# Patient Record
Sex: MALE | NOT HISPANIC OR LATINO | URBAN - METROPOLITAN AREA
[De-identification: names, ages, dates, MRNs, and addresses within clinical notes are randomized per-mention and may not be internally consistent; named-entity substitution may affect disease eponyms.]

---

## 2018-08-28 ENCOUNTER — APPOINTMENT (RX ONLY)
Dept: URBAN - METROPOLITAN AREA CLINIC 37 | Facility: CLINIC | Age: 33
Setting detail: DERMATOLOGY
End: 2018-08-28

## 2018-08-28 ENCOUNTER — APPOINTMENT (RX ONLY)
Dept: URBAN - METROPOLITAN AREA CLINIC 38 | Facility: CLINIC | Age: 33
Setting detail: DERMATOLOGY
End: 2018-08-28

## 2018-08-28 DIAGNOSIS — L20.89 OTHER ATOPIC DERMATITIS: ICD-10-CM

## 2018-08-28 PROBLEM — J45.909 UNSPECIFIED ASTHMA, UNCOMPLICATED: Status: ACTIVE | Noted: 2018-08-28

## 2018-08-28 PROBLEM — D23.5 OTHER BENIGN NEOPLASM OF SKIN OF TRUNK: Status: ACTIVE | Noted: 2018-08-28

## 2018-08-28 PROBLEM — L29.8 OTHER PRURITUS: Status: ACTIVE | Noted: 2018-08-28

## 2018-08-28 PROBLEM — L20.84 INTRINSIC (ALLERGIC) ECZEMA: Status: ACTIVE | Noted: 2018-08-28

## 2018-08-28 PROBLEM — L85.3 XEROSIS CUTIS: Status: ACTIVE | Noted: 2018-08-28

## 2018-08-28 PROCEDURE — ? PRESCRIPTION

## 2018-08-28 PROCEDURE — 99202 OFFICE O/P NEW SF 15 MIN: CPT | Mod: 25

## 2018-08-28 PROCEDURE — ? ADDITIONAL NOTES

## 2018-08-28 PROCEDURE — ? BIOPSY BY PUNCH METHOD

## 2018-08-28 PROCEDURE — ? COUNSELING

## 2018-08-28 PROCEDURE — 11100: CPT

## 2018-08-28 RX ORDER — TRIAMCINOLONE ACETONIDE 1 MG/G
1 CREAM TOPICAL BID
Qty: 1 | Refills: 1 | Status: ERX | COMMUNITY
Start: 2018-08-28

## 2018-08-28 RX ORDER — HYDROCORTISONE 25 MG/G
1 CREAM TOPICAL
Qty: 1 | Refills: 0 | Status: ERX | COMMUNITY
Start: 2018-08-28

## 2018-08-28 RX ADMIN — TRIAMCINOLONE ACETONIDE 1: 1 CREAM TOPICAL at 18:25

## 2018-08-28 RX ADMIN — HYDROCORTISONE 1: 25 CREAM TOPICAL at 18:28

## 2018-08-28 ASSESSMENT — SEVERITY ASSESSMENT
SEVERITY: SEVERE
SEVERITY: SEVERE

## 2018-08-28 ASSESSMENT — LOCATION SIMPLE DESCRIPTION DERM
LOCATION SIMPLE: LEFT UPPER ARM
LOCATION SIMPLE: LEFT CHEEK
LOCATION SIMPLE: RIGHT UPPER ARM
LOCATION SIMPLE: GROIN
LOCATION SIMPLE: RIGHT UPPER ARM
LOCATION SIMPLE: RIGHT THIGH
LOCATION SIMPLE: LEFT CHEEK
LOCATION SIMPLE: RIGHT THIGH
LOCATION SIMPLE: GROIN
LOCATION SIMPLE: LEFT UPPER ARM

## 2018-08-28 ASSESSMENT — LOCATION ZONE DERM
LOCATION ZONE: LEG
LOCATION ZONE: ARM
LOCATION ZONE: TRUNK
LOCATION ZONE: TRUNK
LOCATION ZONE: FACE
LOCATION ZONE: ARM
LOCATION ZONE: FACE
LOCATION ZONE: LEG

## 2018-08-28 ASSESSMENT — BSA ECZEMA
% BODY COVERED IN ECZEMA: 75
% BODY COVERED IN ECZEMA: 75

## 2018-08-28 ASSESSMENT — LOCATION DETAILED DESCRIPTION DERM
LOCATION DETAILED: RIGHT ANTERIOR DISTAL UPPER ARM
LOCATION DETAILED: LEFT INFERIOR CENTRAL MALAR CHEEK
LOCATION DETAILED: RIGHT ANTERIOR DISTAL UPPER ARM
LOCATION DETAILED: RIGHT ANTERIOR DISTAL THIGH
LOCATION DETAILED: LEFT PROXIMAL POSTERIOR UPPER ARM
LOCATION DETAILED: SUPRAPUBIC SKIN
LOCATION DETAILED: LEFT INFERIOR CENTRAL MALAR CHEEK
LOCATION DETAILED: LEFT ANTERIOR DISTAL UPPER ARM
LOCATION DETAILED: SUPRAPUBIC SKIN
LOCATION DETAILED: RIGHT ANTERIOR DISTAL THIGH
LOCATION DETAILED: LEFT ANTERIOR DISTAL UPPER ARM
LOCATION DETAILED: LEFT PROXIMAL POSTERIOR UPPER ARM

## 2018-08-28 NOTE — HPI: EVALUATION OF SKIN LESION(S)
Hpi Title: Evaluation of Skin Lesions
How Severe Are Your Spot(S)?: mild
Have Your Spot(S) Been Treated In The Past?: has not been treated
Additional History: Pt is here has a history of eczema. Has some areas all over that he is concerned with.

## 2018-08-28 NOTE — HPI: EVALUATION OF SKIN LESION(S)
How Severe Are Your Spot(S)?: mild
Have Your Spot(S) Been Treated In The Past?: has not been treated
Hpi Title: Evaluation of Skin Lesions
Additional History: Pt is here has a history of eczema. Has some areas all over that he is concerned with.

## 2018-08-28 NOTE — PROCEDURE: ADDITIONAL NOTES
Additional Notes: This is adult-onset eczematous dermatitis that started several years ago.  It seems to have gotten more widespread over the past few months.  It can be very itchy (8 or 9/10).  He does have a strong personal h/o asthma.  He has been to the hospital for asthma and taken oral steroids in the past.\\n\\nGiven the wide BSA, I think treatment with a systemic makes better sense.  I recommend DUPIXENT.  Biopsy taken today from the L upper arm / shoulder skin to confirm the diagnosis.  Literature was given to the patient re: DUPIXENT.  \\n\\nRTC 2w for S/R.  In the meantime, he can use emollients and topical steroids.

## 2018-08-28 NOTE — PROCEDURE: BIOPSY BY PUNCH METHOD
Lab Facility: [id number]
Render Post-Care Instructions In Note?: no
Post-Care Instructions: I reviewed with the patient in detail post-care instructions. Patient is to keep the biopsy site dry overnight, and then apply bacitracin twice daily until healed. Patient may apply hydrogen peroxide soaks to remove any crusting.
Notification Instructions: Patient will be notified of biopsy results. However, patient instructed to call the office if not contacted within 2 weeks.
Detail Level: Simple
X Depth Of Punch In Cm (Optional): 0
Home Suture Removal Text: Patient was provided a home suture removal kit and will remove their sutures at home.  If they have any questions or difficulties they will call the office.
Anesthesia Volume In Cc: 1
Dressing: bandage
Wound Care: Petrolatum
Anesthesia Type: 1% lidocaine with epinephrine
Epidermal Sutures: 4-0 Ethilon
Hemostasis: None
Billing Type: Third-Party Bill
Consent: Written consent was obtained and risks were reviewed including but not limited to scarring, infection, bleeding, scabbing, incomplete removal, nerve damage and allergy to anesthesia.
Lab: [id number]
Punch Size In Mm: 4
Suture Removal: 14 days
Biopsy Type: H and E
Was A Bandage Applied: Yes

## 2018-08-28 NOTE — PROCEDURE: BIOPSY BY PUNCH METHOD
Size Of Lesion In Cm (Optional): 0
Anesthesia Volume In Cc: 1
Patient Will Remove Sutures At Home?: No
Suture Removal: 14 days
Detail Level: Simple
Home Suture Removal Text: Patient was provided a home suture removal kit and will remove their sutures at home.  If they have any questions or difficulties they will call the office.
Billing Type: Third-Party Bill
Dressing: bandage
Punch Size In Mm: 4
Biopsy Type: H and E
Was A Bandage Applied: Yes
Anesthesia Type: 1% lidocaine with epinephrine
Hemostasis: None
Notification Instructions: Patient will be notified of biopsy results. However, patient instructed to call the office if not contacted within 2 weeks.
Wound Care: Petrolatum
Epidermal Sutures: 4-0 Ethilon
Post-Care Instructions: I reviewed with the patient in detail post-care instructions. Patient is to keep the biopsy site dry overnight, and then apply bacitracin twice daily until healed. Patient may apply hydrogen peroxide soaks to remove any crusting.
Consent: Written consent was obtained and risks were reviewed including but not limited to scarring, infection, bleeding, scabbing, incomplete removal, nerve damage and allergy to anesthesia.

## 2018-09-14 ENCOUNTER — APPOINTMENT (RX ONLY)
Dept: URBAN - METROPOLITAN AREA CLINIC 45 | Facility: CLINIC | Age: 33
Setting detail: DERMATOLOGY
End: 2018-09-14

## 2018-09-14 ENCOUNTER — APPOINTMENT (RX ONLY)
Dept: URBAN - METROPOLITAN AREA CLINIC 44 | Facility: CLINIC | Age: 33
Setting detail: DERMATOLOGY
End: 2018-09-14

## 2018-09-14 DIAGNOSIS — Z48.02 ENCOUNTER FOR REMOVAL OF SUTURES: ICD-10-CM

## 2018-09-14 DIAGNOSIS — L20.89 OTHER ATOPIC DERMATITIS: ICD-10-CM

## 2018-09-14 PROBLEM — L29.8 OTHER PRURITUS: Status: ACTIVE | Noted: 2018-09-14

## 2018-09-14 PROBLEM — J45.909 UNSPECIFIED ASTHMA, UNCOMPLICATED: Status: ACTIVE | Noted: 2018-09-14

## 2018-09-14 PROBLEM — L85.3 XEROSIS CUTIS: Status: ACTIVE | Noted: 2018-09-14

## 2018-09-14 PROCEDURE — 99213 OFFICE O/P EST LOW 20 MIN: CPT

## 2018-09-14 PROCEDURE — ? DUPIXENT INITIATION

## 2018-09-14 PROCEDURE — 99024 POSTOP FOLLOW-UP VISIT: CPT

## 2018-09-14 PROCEDURE — ? COUNSELING

## 2018-09-14 PROCEDURE — ? TREATMENT REGIMEN

## 2018-09-14 PROCEDURE — ? SUTURE REMOVAL (GLOBAL PERIOD)

## 2018-09-14 ASSESSMENT — LOCATION DETAILED DESCRIPTION DERM
LOCATION DETAILED: LEFT PROXIMAL POSTERIOR UPPER ARM
LOCATION DETAILED: LEFT PROXIMAL POSTERIOR UPPER ARM
LOCATION DETAILED: RIGHT INFERIOR FOREHEAD
LOCATION DETAILED: RIGHT PROXIMAL POSTERIOR UPPER ARM
LOCATION DETAILED: RIGHT INFERIOR FOREHEAD
LOCATION DETAILED: RIGHT PROXIMAL POSTERIOR UPPER ARM
LOCATION DETAILED: LEFT ANTERIOR PROXIMAL UPPER ARM
LOCATION DETAILED: LEFT ANTERIOR PROXIMAL UPPER ARM

## 2018-09-14 ASSESSMENT — LOCATION SIMPLE DESCRIPTION DERM
LOCATION SIMPLE: RIGHT FOREHEAD
LOCATION SIMPLE: RIGHT UPPER ARM
LOCATION SIMPLE: LEFT UPPER ARM
LOCATION SIMPLE: RIGHT FOREHEAD
LOCATION SIMPLE: RIGHT UPPER ARM
LOCATION SIMPLE: LEFT UPPER ARM

## 2018-09-14 ASSESSMENT — LOCATION ZONE DERM
LOCATION ZONE: FACE
LOCATION ZONE: ARM
LOCATION ZONE: FACE
LOCATION ZONE: ARM

## 2018-09-14 ASSESSMENT — BSA ECZEMA
% BODY COVERED IN ECZEMA: 70
% BODY COVERED IN ECZEMA: 70

## 2018-09-14 ASSESSMENT — SEVERITY ASSESSMENT
SEVERITY: SEVERE
SEVERITY: SEVERE

## 2018-09-14 NOTE — PROCEDURE: SUTURE REMOVAL (GLOBAL PERIOD)
Add 21587 Cpt? (Important Note: In 2017 The Use Of 79239 Is Being Tracked By Cms To Determine Future Global Period Reimbursement For Global Periods): yes
Detail Level: Simple

## 2018-09-14 NOTE — PROCEDURE: TREATMENT REGIMEN
Plan: Discussed with pt that if topical medications Triamcinolone and Hydrocortisone that he can continue this regimen. Discussed that another treatment down the line could be Dupixent which is a an injection that pt could do in his home. Advised that while on Dupixent pt would be using topical regimen in conjunction. Pt advised that we need blood work today, pt was just seen at PCP and will bring in lab results from visit. Labs requested are CBC with Dif and Complete Metabolic Panel. Paperwork initiated at today’s visit for Dupixent
Continue Regimen: TMC to body bid x 2 weeks per month \\nHydrocortisone bid x 2 weeks per month
Detail Level: Zone

## 2018-09-14 NOTE — PROCEDURE: TREATMENT REGIMEN
Detail Level: Zone
Plan: Discussed with pt that if topical medications Triamcinolone and Hydrocortisone that he can continue this regimen. Discussed that another treatment down the line could be Dupixent which is a an injection that pt could do in his home. Advised that while on Dupixent pt would be using topical regimen in conjunction. Pt advised that we need blood work today, pt was just seen at PCP and will bring in lab results from visit. Labs requested are CBC with Dif and Complete Metabolic Panel. Paperwork initiated at today’s visit for Dupixent
Continue Regimen: TMC to body bid x 2 weeks per month \\nHydrocortisone bid x 2 weeks per month

## 2018-09-14 NOTE — PROCEDURE: SUTURE REMOVAL (GLOBAL PERIOD)
Add 46156 Cpt? (Important Note: In 2017 The Use Of 55319 Is Being Tracked By Cms To Determine Future Global Period Reimbursement For Global Periods): yes
Detail Level: Simple

## 2018-10-12 ENCOUNTER — RX ONLY (OUTPATIENT)
Age: 33
Setting detail: RX ONLY
End: 2018-10-12

## 2018-10-12 RX ORDER — DUPILUMAB 300 MG/2ML
1 SYRINGE INJECTION, SOLUTION SUBCUTANEOUS Q2WEEKS
Qty: 1 | Refills: 6 | Status: ERX

## 2018-10-12 RX ORDER — DUPILUMAB 300 MG/2ML
2 SYRINGES INJECTION, SOLUTION SUBCUTANEOUS ONCE
Qty: 2 | Refills: 0 | Status: ERX | COMMUNITY
Start: 2018-10-12